# Patient Record
Sex: FEMALE | Race: WHITE | NOT HISPANIC OR LATINO | Employment: UNEMPLOYED | URBAN - METROPOLITAN AREA
[De-identification: names, ages, dates, MRNs, and addresses within clinical notes are randomized per-mention and may not be internally consistent; named-entity substitution may affect disease eponyms.]

---

## 2017-04-15 ENCOUNTER — HOSPITAL ENCOUNTER (EMERGENCY)
Facility: HOSPITAL | Age: 27
Discharge: HOME | End: 2017-04-15
Attending: EMERGENCY MEDICINE
Payer: MEDICAID

## 2017-04-15 VITALS
TEMPERATURE: 99 F | SYSTOLIC BLOOD PRESSURE: 107 MMHG | OXYGEN SATURATION: 100 % | HEIGHT: 70 IN | WEIGHT: 200 LBS | HEART RATE: 81 BPM | DIASTOLIC BLOOD PRESSURE: 55 MMHG | BODY MASS INDEX: 28.63 KG/M2 | RESPIRATION RATE: 16 BRPM

## 2017-04-15 DIAGNOSIS — R05.9 COUGH: ICD-10-CM

## 2017-04-15 DIAGNOSIS — R19.7 VOMITING AND DIARRHEA: Primary | ICD-10-CM

## 2017-04-15 DIAGNOSIS — R11.10 VOMITING AND DIARRHEA: Primary | ICD-10-CM

## 2017-04-15 DIAGNOSIS — R10.13 EPIGASTRIC ABDOMINAL PAIN: ICD-10-CM

## 2017-04-15 LAB
ALBUMIN SERPL BCP-MCNC: 2.9 G/DL
ALP SERPL-CCNC: 51 U/L
ALT SERPL W/O P-5'-P-CCNC: 12 U/L
ANION GAP SERPL CALC-SCNC: 9 MMOL/L
AST SERPL-CCNC: 18 U/L
B-HCG UR QL: POSITIVE
BASOPHILS # BLD AUTO: 0 K/UL
BASOPHILS NFR BLD: 0.3 %
BILIRUB SERPL-MCNC: 0.3 MG/DL
BUN SERPL-MCNC: 13 MG/DL
CALCIUM SERPL-MCNC: 8.6 MG/DL
CHLORIDE SERPL-SCNC: 107 MMOL/L
CO2 SERPL-SCNC: 20 MMOL/L
CREAT SERPL-MCNC: 0.6 MG/DL
CTP QC/QA: YES
DIFFERENTIAL METHOD: ABNORMAL
EOSINOPHIL # BLD AUTO: 0.1 K/UL
EOSINOPHIL NFR BLD: 0.6 %
ERYTHROCYTE [DISTWIDTH] IN BLOOD BY AUTOMATED COUNT: 13.2 %
EST. GFR  (AFRICAN AMERICAN): >60 ML/MIN/1.73 M^2
EST. GFR  (NON AFRICAN AMERICAN): >60 ML/MIN/1.73 M^2
GLUCOSE SERPL-MCNC: 95 MG/DL
HCT VFR BLD AUTO: 31.6 %
HGB BLD-MCNC: 10.4 G/DL
LIPASE SERPL-CCNC: 27 U/L
LYMPHOCYTES # BLD AUTO: 0.7 K/UL
LYMPHOCYTES NFR BLD: 7.2 %
MCH RBC QN AUTO: 29.5 PG
MCHC RBC AUTO-ENTMCNC: 33.1 %
MCV RBC AUTO: 89 FL
MONOCYTES # BLD AUTO: 0.4 K/UL
MONOCYTES NFR BLD: 3.6 %
NEUTROPHILS # BLD AUTO: 8.9 K/UL
NEUTROPHILS NFR BLD: 88.3 %
PLATELET # BLD AUTO: 168 K/UL
PMV BLD AUTO: 9.5 FL
POTASSIUM SERPL-SCNC: 3.8 MMOL/L
PROT SERPL-MCNC: 6.7 G/DL
RBC # BLD AUTO: 3.54 M/UL
SODIUM SERPL-SCNC: 136 MMOL/L
WBC # BLD AUTO: 10.1 K/UL

## 2017-04-15 PROCEDURE — 80053 COMPREHEN METABOLIC PANEL: CPT

## 2017-04-15 PROCEDURE — 96361 HYDRATE IV INFUSION ADD-ON: CPT

## 2017-04-15 PROCEDURE — 96375 TX/PRO/DX INJ NEW DRUG ADDON: CPT

## 2017-04-15 PROCEDURE — 99284 EMERGENCY DEPT VISIT MOD MDM: CPT | Mod: 25

## 2017-04-15 PROCEDURE — 85025 COMPLETE CBC W/AUTO DIFF WBC: CPT

## 2017-04-15 PROCEDURE — 25000003 PHARM REV CODE 250: Performed by: EMERGENCY MEDICINE

## 2017-04-15 PROCEDURE — 81025 URINE PREGNANCY TEST: CPT | Performed by: EMERGENCY MEDICINE

## 2017-04-15 PROCEDURE — 96365 THER/PROPH/DIAG IV INF INIT: CPT

## 2017-04-15 PROCEDURE — 63600175 PHARM REV CODE 636 W HCPCS: Performed by: EMERGENCY MEDICINE

## 2017-04-15 PROCEDURE — 83690 ASSAY OF LIPASE: CPT

## 2017-04-15 PROCEDURE — 36415 COLL VENOUS BLD VENIPUNCTURE: CPT

## 2017-04-15 RX ORDER — ACETAMINOPHEN 10 MG/ML
1000 INJECTION, SOLUTION INTRAVENOUS
Status: COMPLETED | OUTPATIENT
Start: 2017-04-15 | End: 2017-04-15

## 2017-04-15 RX ORDER — ONDANSETRON 2 MG/ML
4 INJECTION INTRAMUSCULAR; INTRAVENOUS
Status: COMPLETED | OUTPATIENT
Start: 2017-04-15 | End: 2017-04-15

## 2017-04-15 RX ADMIN — ONDANSETRON 4 MG: 2 INJECTION INTRAMUSCULAR; INTRAVENOUS at 04:04

## 2017-04-15 RX ADMIN — SODIUM CHLORIDE 1000 ML: 0.9 INJECTION, SOLUTION INTRAVENOUS at 04:04

## 2017-04-15 RX ADMIN — ACETAMINOPHEN 1000 MG: 10 INJECTION, SOLUTION INTRAVENOUS at 04:04

## 2017-04-15 NOTE — ED PROVIDER NOTES
Chief complaint:  Abdominal Pain; Vomiting; Diarrhea; and Cough      HPI:  Ariela Calderon is a 26 y.o. female presenting with a 2 day history of nonproductive cough with several sick contacts in the family absence significant dyspnea with new onset of several episodes of nonbilious, nonbloody vomiting and 4 episodes of nonbloody diarrhea this evening.  Patient is having mild, intermittent, crampy pain centered in the epigastric area not present at the time of my evaluation.  It lasts several minutes at a time and then resolves.  She denies radiation or migration of pain.  No contraction like pain.  No passage of vaginal fluid, tissue, or vaginal bleeding.  Patient is .  She is 26 weeks pregnant.    ROS: As per HPI and below:  No headache, neck pain, syncope, chest pain, hemoptysis, measured fever, rashes, swelling, blood in the stools.    Review of patient's allergies indicates:  No Known Allergies    Patient's Medications   New Prescriptions    No medications on file   Previous Medications    PRENATAL VIT37/IRON/FOLIC ACID (PRENATA ORAL)    Take by mouth.   Modified Medications    No medications on file   Discontinued Medications    No medications on file       PMH:  As per HPI and below:  History reviewed. No pertinent past medical history.  History reviewed. No pertinent surgical history.    .      No hx of DM.    Social History     Social History    Marital status: Significant Other     Spouse name: N/A    Number of children: N/A    Years of education: N/A     Social History Main Topics    Smoking status: Never Smoker    Smokeless tobacco: None    Alcohol use No    Drug use: No    Sexual activity: Not Asked     Other Topics Concern    None     Social History Narrative    None       History reviewed. No pertinent family history.    Physical Exam:    Vitals:    04/15/17 0501   BP: (!) 107/55   Pulse: 81   Resp:    Temp:      GENERAL:  No apparent distress.  Alert.    HEENT:  Moist mucous  membranes.  Normocephalic and atraumatic.    NECK:  No swelling.  Midline trachea.  No JVD.  CARDIOVASCULAR:  Regular rate and rhythm.  2+ radial pulses.  No murmurs auscultated.  PULMONARY:  Lungs clear to auscultation bilaterally.  No wheezes, rales, or rhonci.  Unlabored respirations  ABDOMEN:  Gravid abdomen.  No reproducible tenderness to palpation.  No palpable hernias, masses.  EXTREMITIES:  Warm and well perfused.  Brisk capillary refill.  Trace pitting pedal edema bilaterally.  Legs are symmetric and nontender to palpation.  2+ DP and PT pulses.  NEUROLOGICAL:  Normal mental status.  Appropriate and conversant.  Normal gait.  SKIN:  No rashes or ecchymoses.    BACK:  Atraumatic.  No CVA tenderness to palpation.      Labs Reviewed   CBC W/ AUTO DIFFERENTIAL - Abnormal; Notable for the following:        Result Value    RBC 3.54 (*)     Hemoglobin 10.4 (*)     Hematocrit 31.6 (*)     Gran # 8.9 (*)     Lymph # 0.7 (*)     Gran% 88.3 (*)     Lymph% 7.2 (*)     Mono% 3.6 (*)     All other components within normal limits   COMPREHENSIVE METABOLIC PANEL - Abnormal; Notable for the following:     CO2 20 (*)     Calcium 8.6 (*)     Albumin 2.9 (*)     Alkaline Phosphatase 51 (*)     All other components within normal limits   POCT URINE PREGNANCY - Abnormal; Notable for the following:     POC Preg Test, Ur Positive (*)     All other components within normal limits   LIPASE       Current Discharge Medication List      CONTINUE these medications which have NOT CHANGED    Details   PRENATAL VIT37/IRON/FOLIC ACID (PRENATA ORAL) Take by mouth.             Orders Placed This Encounter   Procedures    CBC auto differential    Comprehensive metabolic panel    Lipase    Vital signs    POCT urine pregnancy    Insert peripheral IV       Imaging Results     None          ED Course           MDM:    26 y.o. female with has multiple symptoms including cough as well as vomiting and diarrhea.  She has had transient  intermittent upper abdominal pain.  There is no reproducible tenderness on examination with very low suspicion for emergent intra-abdominal process such as cholecystitis, obstruction, atypical appendicitis.  Laboratory sent to rule out dehydration and to assure no significant metabolic derangement.  I do not think further abdominal imaging is indicated at this point.  Patient strongly wishes to avoid this, which I feel is reasonable. Patient is pregnant but I do not think this is a complication of pregnancy and do not think she requires transfer for OB monitoring.  I do not think pelvic examination is indicated as there is no other concerning symptoms and pain is primarily upper.  I doubt premature labor.  Cough may be related to viral URI with multiple sick contacts in the family.  Lungs are clear and I doubt pneumonia.  I do not think antibiotics are indicated.  Vomiting and diarrhea may also be viral in etiology with no high-risk features for bacterial cause.    Patient feeling markedly better after symptomatic therapy.  Close follow up with OB recommended.  Acetaminophen and ondansetron as necessary for symptoms recommended for home.  She has these medications.  Return precautions reviewed in detail.    Diagnoses:    1. Cough  2. Vomiting and diarrhea  3. Epigastric abdominal pain     Rogelio Case MD  04/15/17 0709

## 2017-04-15 NOTE — ED NOTES
Patient identifiers for Ariela Calderon checked and correct.  LOC:  Patient is awake, alert, and aware of environment with an appropriate affect. Patient is oriented x 3 and speaking appropriately.  APPEARANCE:  Patient resting comfortably and in no acute distress. Patient is clean and well groomed, patient's clothing is properly fastened.  SKIN:  The skin is warm and dry. Patient has normal skin turgor and moist mucus membranes. Skin is intact; no bruising or breakdown noted.  MUSCULOSKELETAL:  Patient is moving all extremities well, no obvious deformities noted. Pulses intact.   RESPIRATORY:  Airway is open and patent. Respirations are spontaneous and non-labored with normal effort and rate.  CARDIAC:  Patient has a normal rate and rhythm. No peripheral edema noted. Capillary refill < 3 seconds.  ABDOMEN:  Pt 24 wks pregnant, bora upper abd pain.  NEUROLOGICAL:  PERRL. Facial expression is symmetrical. Hand grasps are equal bilaterally. Normal sensation in all extremities when touched with finger.  Allergies reported: Review of patient's allergies indicates:  No Known Allergies  OTHER NOTES: Pt c/o pain to bora upper abd, associated with N/V/D that started last night, and also reports cough x2 days. Pt is 24 wks pregnant, denies contraction like pain and vaginal bleeding.

## 2017-04-15 NOTE — ED NOTES
Pt states she is feeling better, okay for d/c, waiting for fluids to be done. Pt updated of POC, verbalizes understanding.

## 2017-04-15 NOTE — ED AVS SNAPSHOT
[facility] [address] [place] [id number]               Ariela Calderon   4/15/2017  3:32 AM   ED    Description:  Female : 1990   Department:  [facility]           Your Care was Coordinated By:     Provider Role From To    Rogelio Case MD Attending Provider 04/15/17 0335 --      Reason for Visit     Abdominal Pain     Vomiting     Diarrhea     Cough           Diagnoses this Visit        Comments    Vomiting and diarrhea    -  Primary     Epigastric abdominal pain         Cough           ED Disposition     None           To Do List           Follow-up Information     Please follow up.    Why:  Your OB doctor, 2-4 days      [facility]     [facility] Nurse Care Line -  Assistance  Unless otherwise directed by your provider, please contact [facility], our nurse care line that is available for  assistance.     Registered nurses in the [facility] provide: appointment scheduling, clinical advisement, health education, and other advisory services.  Call: [phone] (toll free)               Medications           Message regarding Medications     Verify the changes and/or additions to your medication regime listed below are the same as discussed with your clinician today.  If any of these changes or additions are incorrect, please notify your healthcare provider.        These medications were administered today        Dose Freq    sodium chloride 0.9% bolus 1,000 mL 1,000 mL ED 1 Time    Sig: Inject 1,000 mLs into the vein ED 1 Time.    Class: Normal    Route: Intravenous    ondansetron injection 4 mg 4 mg ED 1 Time    Sig: Inject 4 mg into the vein ED 1 Time.    Class: Normal    Route: Intravenous    acetaminophen (10 mg/mL) injection 1,000 mg 1,000 mg ED 1 Time    Sig: Inject 100 mLs (1,000 mg total) into the vein ED 1 Time.    Class: Normal    Route: Intravenous           Verify that the below  "list of medications is an accurate representation of the medications you are currently taking.  If none reported, the list may be blank. If incorrect, please contact your healthcare provider. Carry this list with you in case of emergency.           Current Medications     PRENATAL VIT37/IRON/FOLIC ACID (PRENATA ORAL) Take by mouth.           Clinical Reference Information           Your Vitals Were     BP Pulse Temp Resp Height Weight    117/62 95 98.6 °F (37 °C) (Oral) 16 5' 10" (1.778 m) 90.7 kg (200 lb)    SpO2 BMI             98% 28.7 kg/m2         Allergies as of 4/15/2017     No Known Allergies      Immunizations Administered on Date of Encounter - 4/15/2017     None      ED Micro, Lab, POCT     Start Ordered       Status Ordering Provider    04/15/17 0356 04/15/17 0356  CBC auto differential  STAT      Final result     04/15/17 0356 04/15/17 0356  Comprehensive metabolic panel  STAT      Final result     04/15/17 0356 04/15/17 0356  Lipase  STAT      Final result     04/15/17 0337 04/15/17 0336  POCT urine pregnancy  Once      Final result       ED Imaging Orders     None      Discharge References/Attachments     VOMITING AND DIARRHEA, NONSPECIFIC (ADULT) (ENGLISH)    EPIGASTRIC PAIN (UNCERTAIN CAUSE) (ENGLISH)      [facility] Sign-Up     Activating your [facility] account is as easy as 1-2-3!     1) Visit [email or web address], select Sign Up Now, enter this activation code and your date of birth, then select Next.  [email or web address]  Expires: 5/30/2017  5:01 AM      2) Create a username and password to use when you visit [facility] in the future and select a security question in case you lose your password and select Next.    3) Enter your e-mail address and click Sign Up!    Additional Information  If you have questions, please e-mail [email or web address] or call [phone] to talk to our [facility] staff. Remember, [facility] is NOT to be used for urgent needs. For medical emergencies, dial 911.          [facility] complies with applicable Federal civil rights laws and does not discriminate on the basis of race, color, national origin, age, disability, or sex.        Language Assistance Services     ATTENTION: Language assistance services are available, free of charge. Please call [phone].      ATENCIÓN: Si habla español, tiene a park disposición servicios gratuitos de asistencia lingüística. Llame al [phone].     CHÚ Ý: N?u b?n nói Ti?ng Vi?t, có các d?ch v? h? tr? ngôn ng? mi?n phí dành cho b?n. G?i s? [phone].

## 2017-06-22 ENCOUNTER — HOSPITAL ENCOUNTER (EMERGENCY)
Facility: HOSPITAL | Age: 27
Discharge: HOME | End: 2017-06-22
Attending: EMERGENCY MEDICINE
Payer: MEDICAID

## 2017-06-22 VITALS
SYSTOLIC BLOOD PRESSURE: 122 MMHG | OXYGEN SATURATION: 99 % | DIASTOLIC BLOOD PRESSURE: 74 MMHG | WEIGHT: 219 LBS | BODY MASS INDEX: 31.42 KG/M2 | RESPIRATION RATE: 16 BRPM | TEMPERATURE: 98 F | HEART RATE: 120 BPM

## 2017-06-22 DIAGNOSIS — S61.412A LACERATION OF LEFT HAND WITHOUT FOREIGN BODY, INITIAL ENCOUNTER: Primary | ICD-10-CM

## 2017-06-22 PROCEDURE — 12001 RPR S/N/AX/GEN/TRNK 2.5CM/<: CPT

## 2017-06-22 PROCEDURE — 25000003 PHARM REV CODE 250: Performed by: PHYSICIAN ASSISTANT

## 2017-06-22 PROCEDURE — 99283 EMERGENCY DEPT VISIT LOW MDM: CPT | Mod: 25

## 2017-06-22 RX ORDER — LIDOCAINE HYDROCHLORIDE 10 MG/ML
10 INJECTION INFILTRATION; PERINEURAL
Status: COMPLETED | OUTPATIENT
Start: 2017-06-22 | End: 2017-06-22

## 2017-06-22 RX ADMIN — LIDOCAINE HYDROCHLORIDE 10 ML: 10 INJECTION, SOLUTION INFILTRATION; PERINEURAL at 02:06

## 2017-06-22 NOTE — DISCHARGE INSTRUCTIONS
See your primary care provider to have sutures removed in 7-10 days.  Take tylenol as needed for pain.  Keep area clean and dry.  Wash with soap and water and pat dry.  For worsening symptoms, chest pain, shortness of breath, increased abdominal pain, high grade fever, stroke or stroke like symptoms, immediately go to the nearest Emergency Room or call 911 as soon as possible.

## 2017-06-22 NOTE — ED PROVIDER NOTES
Encounter Date: 6/22/2017       History     Chief Complaint   Patient presents with    Laceration     left hand laceration     Patient is a 26-year-old female who presents for laceration to left hand.  She denied past medical history.  She states she was working with [employer] when 2 pieces pinched her hand.  She reports associated bleeding and pain.  She states her last tetanus shot was within the last 5 years.  She denied attempted treatment.  She reports the pain is mild and constant.      The history is provided by the patient.     Review of patient's allergies indicates:  No Known Allergies  History reviewed. No pertinent past medical history.  History reviewed. No pertinent surgical history.  History reviewed. No pertinent family history.  Social History   Substance Use Topics    Smoking status: Never Smoker    Smokeless tobacco: Never Used    Alcohol use No     Review of Systems   Constitutional: Negative for fever.   HENT: Negative for sore throat.    Respiratory: Negative for shortness of breath.    Cardiovascular: Negative for chest pain.   Gastrointestinal: Negative for abdominal pain, diarrhea, nausea and vomiting.   Genitourinary: Negative for dysuria.   Musculoskeletal: Negative for back pain.        + Left hand pain   Skin: Positive for wound. Negative for rash.   Neurological: Negative for weakness.   Hematological: Does not bruise/bleed easily.       Physical Exam     Initial Vitals [06/22/17 1348]   BP Pulse Resp Temp SpO2   122/74 (!) 120 16 98 °F (36.7 °C) 99 %      MAP       90         Physical Exam    Nursing note and vitals reviewed.  Constitutional: She appears well-developed and well-nourished. No distress.   HENT:   Head: Normocephalic and atraumatic.   Right Ear: External ear normal.   Left Ear: External ear normal.   Eyes: Conjunctivae are normal. Pupils are equal, round, and reactive to light. Right eye exhibits no discharge. Left eye exhibits no discharge.   Neck: Normal range of  motion. Neck supple.   Cardiovascular: Normal rate, regular rhythm and normal heart sounds. Exam reveals no gallop and no friction rub.    No murmur heard.  Pulmonary/Chest: Breath sounds normal. She has no wheezes. She has no rhonchi. She has no rales.   Abdominal: Soft. Bowel sounds are normal. There is no tenderness. There is no guarding.   Gravid   Musculoskeletal: Normal range of motion.        Left hand: She exhibits tenderness and laceration. She exhibits normal range of motion and normal capillary refill. Normal sensation noted. Normal strength noted.        Hands:  Neurological: She is alert.   Skin: Skin is warm and dry.         ED Course   Lac Repair  Date/Time: 6/22/2017 6:48 PM  Performed by: QUYNH FRANKLIN  Authorized by: NORMA KNOX III   Body area: upper extremity  Location details: left hand  Laceration length: 2 cm  Tendon involvement: none  Nerve involvement: none    Anesthesia:  Local Anesthetic: lidocaine 1% without epinephrine  Anesthetic total: 1 mL  Preparation: Patient was prepped and draped in the usual sterile fashion.  Irrigation solution: saline  Irrigation method: jet lavage  Amount of cleaning: standard  Debridement: none  Skin closure: 4-0 Prolene  Number of sutures: 6  Technique: simple  Approximation: close  Approximation difficulty: simple  Dressing: dressing applied  Patient tolerance: Patient tolerated the procedure well with no immediate complications        Labs Reviewed - No data to display          Medical Decision Making:   History:   I obtained history from: someone other than patient.  Old Medical Records: I decided to obtain old medical records.  Clinical Tests:   Radiological Study: Ordered       APC / Resident Notes:   This is an emergent evaluation of a 26 year old female with complaint of laceration to the left hand. Patient is noted to have tenderness to palpation and a 2cm laceration noted. She is neuro-vascularly intact. Xray shows no acute  fracture. Wound was well irrigated. Laceration repair performed, see procedure note. Patient tolerated well. Patient was given instructions on wound care. Follow up with primary care provider for laceration repair. Return precautions given. All questions answered. Case was discussed with Dr. Burns who is in agreement with the plan of care.                 ED Course     Clinical Impression:   The encounter diagnosis was Laceration of left hand without foreign body, initial encounter.                           Kinga Drew PA-C  06/22/17 2744

## 2017-06-22 NOTE — ED NOTES
Laceration to top of left hand. Pt reports piece of granite fell on top of her hand. Pulses present.

## 2017-06-23 ENCOUNTER — HOSPITAL ENCOUNTER (EMERGENCY)
Facility: HOSPITAL | Age: 27
Discharge: HOME | End: 2017-06-23
Attending: EMERGENCY MEDICINE
Payer: MEDICAID

## 2017-06-23 VITALS
SYSTOLIC BLOOD PRESSURE: 127 MMHG | OXYGEN SATURATION: 99 % | DIASTOLIC BLOOD PRESSURE: 71 MMHG | RESPIRATION RATE: 20 BRPM | BODY MASS INDEX: 31.35 KG/M2 | TEMPERATURE: 98 F | WEIGHT: 219 LBS | HEART RATE: 107 BPM | HEIGHT: 70 IN

## 2017-06-23 DIAGNOSIS — Z78.9 TETANUS TOXOID VACCINATION STATUS UNKNOWN: Primary | ICD-10-CM

## 2017-06-23 PROCEDURE — 63600175 PHARM REV CODE 636 W HCPCS: Performed by: PHYSICIAN ASSISTANT

## 2017-06-23 PROCEDURE — 90715 TDAP VACCINE 7 YRS/> IM: CPT | Performed by: PHYSICIAN ASSISTANT

## 2017-06-23 PROCEDURE — 99283 EMERGENCY DEPT VISIT LOW MDM: CPT

## 2017-06-23 PROCEDURE — 90471 IMMUNIZATION ADMIN: CPT | Performed by: PHYSICIAN ASSISTANT

## 2017-06-23 RX ADMIN — CLOSTRIDIUM TETANI TOXOID ANTIGEN (FORMALDEHYDE INACTIVATED), CORYNEBACTERIUM DIPHTHERIAE TOXOID ANTIGEN (FORMALDEHYDE INACTIVATED), BORDETELLA PERTUSSIS TOXOID ANTIGEN (GLUTARALDEHYDE INACTIVATED), BORDETELLA PERTUSSIS FILAMENTOUS HEMAGGLUTININ ANTIGEN (FORMALDEHYDE INACTIVATED), BORDETELLA PERTUSSIS PERTACTIN ANTIGEN, AND BORDETELLA PERTUSSIS FIMBRIAE 2/3 ANTIGEN 0.5 ML: 5; 2; 2.5; 5; 3; 5 INJECTION, SUSPENSION INTRAMUSCULAR at 04:06

## 2017-06-23 NOTE — ED NOTES
Presents to the ER for tetanus. Patient was seen yesterday for laceration and thought she was up to date and found that she is not. AAOx4. Mucous membranes are pink and moist. Skin is warm, dry and intact.

## 2017-06-23 NOTE — ED PROVIDER NOTES
Encounter Date: 6/23/2017       History     Chief Complaint   Patient presents with    Other     presents for tetanus     Patient is a 26 year old female who presents for tetanus vaccine. She was seen in our facility yesterday and reported she was UTD on her tetanus. She states she went home last night and looked at her medical records and is unsure if she is in fact UTD. She called her OB/GYN and they instructed her to return to the ED for her tetanus vaccine. She denied any issues with laceration.       The history is provided by the patient.     Review of patient's allergies indicates:  No Known Allergies  History reviewed. No pertinent past medical history.  History reviewed. No pertinent surgical history.  History reviewed. No pertinent family history.  Social History   Substance Use Topics    Smoking status: Never Smoker    Smokeless tobacco: Never Used    Alcohol use No     Review of Systems   Constitutional: Negative for chills and fever.   HENT: Negative for congestion and sore throat.    Respiratory: Negative for cough and shortness of breath.    Cardiovascular: Negative for chest pain.   Gastrointestinal: Negative for nausea.   Genitourinary: Negative for dysuria.   Musculoskeletal: Negative for back pain.   Skin: Positive for wound. Negative for rash.   Neurological: Negative for weakness.   Hematological: Does not bruise/bleed easily.       Physical Exam     Initial Vitals [06/23/17 1648]   BP Pulse Resp Temp SpO2   127/71 107 20 98 °F (36.7 °C) 99 %      MAP       89.67         Physical Exam    Nursing note and vitals reviewed.  Constitutional: She appears well-developed and well-nourished. No distress.   HENT:   Head: Normocephalic and atraumatic.   Right Ear: External ear normal.   Left Ear: External ear normal.   Eyes: Conjunctivae are normal. Pupils are equal, round, and reactive to light. Right eye exhibits no discharge. Left eye exhibits no discharge.   Neck: Normal range of motion. Neck  supple.   Cardiovascular: Normal rate, regular rhythm and normal heart sounds. Exam reveals no gallop and no friction rub.    No murmur heard.  Pulmonary/Chest: Breath sounds normal. She has no wheezes. She has no rhonchi. She has no rales.   Abdominal: Soft. Bowel sounds are normal. There is no tenderness. There is no guarding.   Gravid    Musculoskeletal: Normal range of motion.   Well appearing laceration to dorsal aspect of left hand   Neurological: She is alert.   Skin: Skin is warm and dry.         ED Course   Procedures  Labs Reviewed - No data to display          Medical Decision Making:   History:   Old Medical Records: I decided to obtain old medical records.       APC / Resident Notes:   This is an emergent evaluation of a 26 year old female who presents for tetanus shot. She was seen in ED yesterday for laceration and reported at that time that she was UTD, but reports she went home and looked at her records and is not sure when her last tetanus was. She was updated on her Tdap. Discussed results with patient. Return precautions given. Patient is to follow up with their primary care provider. Case was discussed with Dr. Olivera who is in agreement with the plan of care. All questions answered.                 ED Course     Clinical Impression:   The encounter diagnosis was Tetanus toxoid vaccination status unknown.                           Kinga Drew PA-C  06/23/17 2705

## 2017-06-23 NOTE — DISCHARGE INSTRUCTIONS
Continue routine wound cleaning.  See your primary care provider in one week.  For worsening symptoms, chest pain, shortness of breath, increased abdominal pain, high grade fever, stroke or stroke like symptoms, immediately go to the nearest Emergency Room or call 911 as soon as possible.

## 2019-09-25 ENCOUNTER — HOSPITAL ENCOUNTER (EMERGENCY)
Facility: HOSPITAL | Age: 29
Discharge: HOME | End: 2019-09-25
Attending: EMERGENCY MEDICINE

## 2019-09-25 VITALS
SYSTOLIC BLOOD PRESSURE: 132 MMHG | RESPIRATION RATE: 18 BRPM | TEMPERATURE: 97 F | WEIGHT: 188 LBS | HEIGHT: 70 IN | DIASTOLIC BLOOD PRESSURE: 69 MMHG | HEART RATE: 83 BPM | BODY MASS INDEX: 26.92 KG/M2 | OXYGEN SATURATION: 97 %

## 2019-09-25 DIAGNOSIS — T85.79XA: Primary | ICD-10-CM

## 2019-09-25 PROCEDURE — 99284 EMERGENCY DEPT VISIT MOD MDM: CPT | Mod: 25

## 2019-09-25 PROCEDURE — 10060 I&D ABSCESS SIMPLE/SINGLE: CPT

## 2019-09-25 RX ORDER — LIDOCAINE HYDROCHLORIDE 20 MG/ML
5 INJECTION, SOLUTION EPIDURAL; INFILTRATION; INTRACAUDAL; PERINEURAL
Status: DISCONTINUED | OUTPATIENT
Start: 2019-09-25 | End: 2019-09-25 | Stop reason: HOSPADM

## 2019-09-25 NOTE — DISCHARGE INSTRUCTIONS
"No purulent material or "pus" was obtained on local, limited incision and drainage.  You should closely follow up with your doctor as discussed.  Continue antibiotics as recommended.  "

## 2019-09-25 NOTE — ED PROVIDER NOTES
Encounter Date: 9/25/2019    SCRIBE #1 NOTE: I, Wendy Burrell, am scribing for, and in the presence of, Rogelio Case MD.       History     Chief Complaint   Patient presents with    Allergic Reaction     birth control implant area  presently on Bactrim       Time seen by provider: 1:25 PM on 09/25/2019    Ariela Lebron is a 29 y.o. female who presents to the ED with an onset of left upper arm pain and redness. She was had a birth control implant (Nexplanon) inserted 5 days ago on 9/20 at [facility]. The patient returned to [facility] yesterday and was started on a course of Bactrim. She reports the redness and pain have worsened compared to yesterday after starting the antibiotics. The patient states she has had this implant before and developed bruising over the insertion site. She denies any other symptoms at this time. No PMHx or PSHx. No known drug allergies.    The history is provided by the patient.     Review of patient's allergies indicates:  No Known Allergies  No past medical history on file.  No past surgical history on file.  No family history on file.  Social History     Tobacco Use    Smoking status: Never Smoker    Smokeless tobacco: Never Used   Substance Use Topics    Alcohol use: No    Drug use: No     Review of Systems   Constitutional: Negative for chills and fever.   HENT: Negative for nosebleeds.    Eyes: Negative for visual disturbance.   Respiratory: Negative for cough and shortness of breath.    Cardiovascular: Negative for chest pain and palpitations.   Gastrointestinal: Negative for abdominal pain, diarrhea, nausea and vomiting.   Genitourinary: Negative for dysuria and hematuria.   Musculoskeletal: Positive for myalgias (left tricept). Negative for back pain and neck pain.   Skin: Positive for color change (erythema). Negative for rash.   Neurological: Negative for seizures, syncope and headaches.     Physical Exam     Initial Vitals [09/25/19 1320]    BP Pulse Resp Temp SpO2   132/69 83 18 97.3 °F (36.3 °C) 97 %      MAP       --         Physical Exam    Nursing note and vitals reviewed.  Constitutional: She appears well-developed and well-nourished. She is not diaphoretic. No distress.   HENT:   Head: Normocephalic and atraumatic.   Mouth/Throat: Oropharynx is clear and moist.   Eyes: Conjunctivae are normal.   Neck: Neck supple.   Cardiovascular: Normal rate, regular rhythm, normal heart sounds and intact distal pulses. Exam reveals no gallop and no friction rub.    No murmur heard.  Pulmonary/Chest: Breath sounds normal. She has no wheezes. She has no rhonchi. She has no rales.   Musculoskeletal: Normal range of motion. She exhibits tenderness.   Area of erythema to the left posterior tricep region with a proximal area of tenderness and mild fluctuance measuring ~1 cm seen on bedside US. No spontaneous drainage.    Neurological: She is alert and oriented to person, place, and time.   Skin: No rash noted. There is erythema.   Psychiatric: Her speech is normal.       ED Course   I & D - Incision and Drainage  Date/Time: 9/25/2019 1:58 PM  Performed by: Rogelio Case MD  Authorized by: Rogelio Case MD   Site marked: the operative site was marked  Body area: upper extremity (left posterior tricep)  Anesthesia: local infiltration    Anesthesia:  Local anesthesia used: yes  Local Anesthetic: lidocaine 2% without epinephrine  Anesthetic total: 1 mL  Patient sedated: no  Scalpel size: 11  Incision type: single straight  Complexity: simple  Drainage: bloody  Drainage amount: scant  Wound treatment: incision,  wound left open and  expression of material  Packing material: none  Complications: No  Specimens: No  Implants: No  Patient tolerance: Patient tolerated the procedure well with no immediate complications        Labs Reviewed - No data to display     Imaging Results    None          Medical Decision Making:   History:   Old Medical Records: I  decided to obtain old medical records.            Scribe Attestation:   Scribe #1: I performed the above scribed service and the documentation accurately describes the services I performed. I attest to the accuracy of the note.    I, Dr. Rogelio Case, personally performed the services described in this documentation. All medical record entries made by the scribe were at my direction and in my presence.  I have reviewed the chart and agree that the record reflects my personal performance and is accurate and complete. Rogelio Case MD.  3:11 PM 09/25/2019    Ariela Lebron is a 29 y.o. female presenting with redness and pain to area of implanted birth control.  She has been following closely with her gynecology group.  She has upcoming close follow-up and is on antibiotics.  There is a small area of fluctuance and surrounding fluid collection seen.  Possibility of hematoma versus abscess discussed in detail with patient who wishes attempted incision and drainage. I did explain that removal of implanted device will be deferred to primary team.  No purulent material obtained and this is not consistent with abscess.  She has been on antibiotics only for around today with insufficient time for response.  I do not think change in antibiotics or admission for IV antibiotics is indicated.  Low suspicion for deep space infection.  I do not think further imaging is indicated.  Return precautions reviewed.        ED Course as of Sep 25 1359   Wed Sep 25, 2019   1357 ABSCESS SIMPLE INCISION & DRAINAGE:  Verbal consent was obtained prior to the procedure with explanation of risks, benefits, and alternatives.  The area was anesthetized with 2% lidocaine intradermal infiltration prior to the start of the procedure.  A 1 cm fluid on the left arm was prepared with betadine,  then incised with a #11 blade.  No purulent drainage was obtained.  The wound was swept 360 degrees with a hemostat to break up any  loculations.  There were no recognized complications and the patient tolerated the procedure well.      [MR]   1358 Bedside US prior to I&D showed implanted device with small rim of surrounding fluid.    [MR]      ED Course User Index  [MR] Rogelio Case MD     Clinical Impression:       ICD-10-CM ICD-9-CM   1. Inflammatory reaction due to other internal device, initial encounter T85.79XA 996.69         Disposition:   Disposition: Discharged  Condition: Stable                        Rogelio Case MD  09/25/19 2294

## 2019-10-09 ENCOUNTER — HOSPITAL ENCOUNTER (EMERGENCY)
Facility: HOSPITAL | Age: 29
Discharge: HOME | End: 2019-10-10
Attending: EMERGENCY MEDICINE

## 2019-10-09 VITALS
HEIGHT: 70 IN | WEIGHT: 182 LBS | RESPIRATION RATE: 18 BRPM | SYSTOLIC BLOOD PRESSURE: 129 MMHG | OXYGEN SATURATION: 98 % | DIASTOLIC BLOOD PRESSURE: 79 MMHG | TEMPERATURE: 98 F | HEART RATE: 61 BPM | BODY MASS INDEX: 26.05 KG/M2

## 2019-10-09 DIAGNOSIS — R19.7 VOMITING AND DIARRHEA: ICD-10-CM

## 2019-10-09 DIAGNOSIS — K52.9 GASTROENTERITIS: Primary | ICD-10-CM

## 2019-10-09 DIAGNOSIS — R11.10 VOMITING AND DIARRHEA: ICD-10-CM

## 2019-10-09 PROCEDURE — 99284 EMERGENCY DEPT VISIT MOD MDM: CPT | Mod: 25

## 2019-10-09 RX ORDER — VALPROIC ACID 250 MG/1
500 CAPSULE, LIQUID FILLED ORAL NIGHTLY
COMMUNITY

## 2019-10-09 RX ORDER — METOCLOPRAMIDE HYDROCHLORIDE 5 MG/ML
10 INJECTION INTRAMUSCULAR; INTRAVENOUS
Status: COMPLETED | OUTPATIENT
Start: 2019-10-09 | End: 2019-10-10

## 2019-10-09 RX ORDER — HYOSCYAMINE SULFATE 0.5 MG/ML
0.5 INJECTION, SOLUTION SUBCUTANEOUS
Status: COMPLETED | OUTPATIENT
Start: 2019-10-09 | End: 2019-10-10

## 2019-10-09 RX ORDER — SODIUM CHLORIDE 9 MG/ML
1000 INJECTION, SOLUTION INTRAVENOUS
Status: COMPLETED | OUTPATIENT
Start: 2019-10-09 | End: 2019-10-10

## 2019-10-09 RX ORDER — TOPIRAMATE 50 MG/1
50 TABLET, FILM COATED ORAL DAILY
COMMUNITY

## 2019-10-10 VITALS
TEMPERATURE: 98 F | HEIGHT: 70 IN | BODY MASS INDEX: 26.05 KG/M2 | HEART RATE: 76 BPM | OXYGEN SATURATION: 100 % | WEIGHT: 182 LBS | RESPIRATION RATE: 16 BRPM | DIASTOLIC BLOOD PRESSURE: 70 MMHG | SYSTOLIC BLOOD PRESSURE: 136 MMHG

## 2019-10-10 DIAGNOSIS — K52.9 GASTROENTERITIS: Primary | ICD-10-CM

## 2019-10-10 LAB
ALBUMIN SERPL BCP-MCNC: 4.5 G/DL (ref 3.5–5.2)
ALP SERPL-CCNC: 57 U/L (ref 55–135)
ALT SERPL W/O P-5'-P-CCNC: 19 U/L (ref 10–44)
ANION GAP SERPL CALC-SCNC: 11 MMOL/L (ref 8–16)
AST SERPL-CCNC: 16 U/L (ref 10–40)
BASOPHILS # BLD AUTO: 0.05 K/UL (ref 0–0.2)
BASOPHILS NFR BLD: 0.4 % (ref 0–1.9)
BILIRUB SERPL-MCNC: 0.4 MG/DL (ref 0.1–1)
BUN SERPL-MCNC: 14 MG/DL (ref 6–20)
CALCIUM SERPL-MCNC: 9.7 MG/DL (ref 8.7–10.5)
CHLORIDE SERPL-SCNC: 104 MMOL/L (ref 95–110)
CO2 SERPL-SCNC: 22 MMOL/L (ref 23–29)
CREAT SERPL-MCNC: 1 MG/DL (ref 0.5–1.4)
DIFFERENTIAL METHOD: ABNORMAL
EOSINOPHIL # BLD AUTO: 0.1 K/UL (ref 0–0.5)
EOSINOPHIL NFR BLD: 0.5 % (ref 0–8)
ERYTHROCYTE [DISTWIDTH] IN BLOOD BY AUTOMATED COUNT: 12.5 % (ref 11.5–14.5)
EST. GFR  (AFRICAN AMERICAN): >60 ML/MIN/1.73 M^2
EST. GFR  (NON AFRICAN AMERICAN): >60 ML/MIN/1.73 M^2
GLUCOSE SERPL-MCNC: 110 MG/DL (ref 70–110)
HCT VFR BLD AUTO: 43.2 % (ref 37–48.5)
HGB BLD-MCNC: 14 G/DL (ref 12–16)
IMM GRANULOCYTES # BLD AUTO: 0.09 K/UL (ref 0–0.04)
LIPASE SERPL-CCNC: 24 U/L (ref 4–60)
LYMPHOCYTES # BLD AUTO: 2.8 K/UL (ref 1–4.8)
LYMPHOCYTES NFR BLD: 22.5 % (ref 18–48)
MCH RBC QN AUTO: 29.9 PG (ref 27–31)
MCHC RBC AUTO-ENTMCNC: 32.4 G/DL (ref 32–36)
MCV RBC AUTO: 92 FL (ref 82–98)
MONOCYTES # BLD AUTO: 0.8 K/UL (ref 0.3–1)
MONOCYTES NFR BLD: 6.4 % (ref 4–15)
NEUTROPHILS # BLD AUTO: 8.6 K/UL (ref 1.8–7.7)
NEUTROPHILS NFR BLD: 69.5 % (ref 38–73)
NRBC BLD-RTO: 0 /100 WBC
PLATELET # BLD AUTO: 197 K/UL (ref 150–350)
PMV BLD AUTO: 12.9 FL (ref 9.2–12.9)
POTASSIUM SERPL-SCNC: 3.6 MMOL/L (ref 3.5–5.1)
PROT SERPL-MCNC: 8.2 G/DL (ref 6–8.4)
RBC # BLD AUTO: 4.69 M/UL (ref 4–5.4)
SODIUM SERPL-SCNC: 137 MMOL/L (ref 136–145)
WBC # BLD AUTO: 12.41 K/UL (ref 3.9–12.7)

## 2019-10-10 PROCEDURE — 96375 TX/PRO/DX INJ NEW DRUG ADDON: CPT

## 2019-10-10 PROCEDURE — 83690 ASSAY OF LIPASE: CPT

## 2019-10-10 PROCEDURE — 85025 COMPLETE CBC W/AUTO DIFF WBC: CPT

## 2019-10-10 PROCEDURE — 36415 COLL VENOUS BLD VENIPUNCTURE: CPT

## 2019-10-10 PROCEDURE — 96365 THER/PROPH/DIAG IV INF INIT: CPT

## 2019-10-10 PROCEDURE — 96374 THER/PROPH/DIAG INJ IV PUSH: CPT

## 2019-10-10 PROCEDURE — 96366 THER/PROPH/DIAG IV INF ADDON: CPT

## 2019-10-10 PROCEDURE — 63600175 PHARM REV CODE 636 W HCPCS: Performed by: EMERGENCY MEDICINE

## 2019-10-10 PROCEDURE — 96361 HYDRATE IV INFUSION ADD-ON: CPT

## 2019-10-10 PROCEDURE — 80053 COMPREHEN METABOLIC PANEL: CPT

## 2019-10-10 PROCEDURE — 25000003 PHARM REV CODE 250: Performed by: EMERGENCY MEDICINE

## 2019-10-10 PROCEDURE — 99284 EMERGENCY DEPT VISIT MOD MDM: CPT | Mod: 25

## 2019-10-10 RX ORDER — SPIRONOLACTONE 100 MG/1
50 TABLET, FILM COATED ORAL EVERY MORNING
COMMUNITY

## 2019-10-10 RX ORDER — DIPHENOXYLATE HYDROCHLORIDE AND ATROPINE SULFATE 2.5; .025 MG/1; MG/1
2 TABLET ORAL 4 TIMES DAILY PRN
Qty: 14 TABLET | Refills: 0 | Status: SHIPPED | OUTPATIENT
Start: 2019-10-10 | End: 2019-10-10 | Stop reason: CLARIF

## 2019-10-10 RX ORDER — DIPHENHYDRAMINE HYDROCHLORIDE 50 MG/ML
25 INJECTION INTRAMUSCULAR; INTRAVENOUS
Status: COMPLETED | OUTPATIENT
Start: 2019-10-10 | End: 2019-10-10

## 2019-10-10 RX ORDER — BIOTIN 1 MG
1000 TABLET ORAL DAILY
COMMUNITY

## 2019-10-10 RX ORDER — ONDANSETRON HYDROCHLORIDE 8 MG/1
8 TABLET, FILM COATED ORAL EVERY 12 HOURS PRN
Qty: 8 TABLET | Refills: 1 | Status: SHIPPED | OUTPATIENT
Start: 2019-10-10 | End: 2019-10-10 | Stop reason: CLARIF

## 2019-10-10 RX ORDER — ONDANSETRON 2 MG/ML
8 INJECTION INTRAMUSCULAR; INTRAVENOUS
Status: COMPLETED | OUTPATIENT
Start: 2019-10-10 | End: 2019-10-10

## 2019-10-10 RX ORDER — CHOLECALCIFEROL (VITAMIN D3) 25 MCG
1000 TABLET ORAL DAILY
COMMUNITY

## 2019-10-10 RX ORDER — VALACYCLOVIR HYDROCHLORIDE 500 MG/1
500 TABLET, FILM COATED ORAL 2 TIMES DAILY PRN
COMMUNITY

## 2019-10-10 RX ORDER — LORAZEPAM 2 MG/ML
0.5 INJECTION INTRAMUSCULAR
Status: COMPLETED | OUTPATIENT
Start: 2019-10-10 | End: 2019-10-10

## 2019-10-10 RX ORDER — PROMETHAZINE HYDROCHLORIDE 25 MG/1
25 TABLET ORAL EVERY 6 HOURS PRN
Qty: 15 TABLET | Refills: 0 | Status: ON HOLD | OUTPATIENT
Start: 2019-10-10 | End: 2019-10-12 | Stop reason: HOSPADM

## 2019-10-10 RX ORDER — ONDANSETRON 4 MG/1
4 TABLET, ORALLY DISINTEGRATING ORAL EVERY 8 HOURS PRN
Qty: 12 TABLET | Refills: 0 | Status: SHIPPED | OUTPATIENT
Start: 2019-10-10

## 2019-10-10 RX ADMIN — SODIUM CHLORIDE 1000 ML: 0.9 INJECTION, SOLUTION INTRAVENOUS at 05:10

## 2019-10-10 RX ADMIN — LIDOCAINE HYDROCHLORIDE: 20 SOLUTION ORAL; TOPICAL at 08:10

## 2019-10-10 RX ADMIN — DIPHENHYDRAMINE HYDROCHLORIDE 25 MG: 50 INJECTION INTRAMUSCULAR; INTRAVENOUS at 08:10

## 2019-10-10 RX ADMIN — ONDANSETRON 8 MG: 2 INJECTION INTRAMUSCULAR; INTRAVENOUS at 05:10

## 2019-10-10 RX ADMIN — LORAZEPAM 0.5 MG: 2 INJECTION INTRAMUSCULAR; INTRAVENOUS at 08:10

## 2019-10-10 RX ADMIN — METOCLOPRAMIDE 10 MG: 5 INJECTION, SOLUTION INTRAMUSCULAR; INTRAVENOUS at 12:10

## 2019-10-10 RX ADMIN — SODIUM CHLORIDE 1000 ML: 0.9 INJECTION, SOLUTION INTRAVENOUS at 12:10

## 2019-10-10 RX ADMIN — PROMETHAZINE HYDROCHLORIDE 25 MG: 25 INJECTION INTRAMUSCULAR; INTRAVENOUS at 05:10

## 2019-10-10 RX ADMIN — HYOSCYAMINE SULFATE 0.5 MG: 0.5 INJECTION, SOLUTION SUBCUTANEOUS at 12:10

## 2019-10-10 RX ADMIN — ONDANSETRON 8 MG: 2 INJECTION INTRAMUSCULAR; INTRAVENOUS at 12:10

## 2019-10-10 NOTE — ED NOTES
Pt. Resting comfortably, tolerated medication well, OBIE, father at bedside, will continue to monitor.

## 2019-10-10 NOTE — ED PROVIDER NOTES
Encounter Date: 10/10/2019    SCRIBE #1 NOTE: Kinga GALLAGHER and am scribing for, and in the presence of, Cj Manuel MD.       History     Chief Complaint   Patient presents with    Nausea    Vomiting     seen here last night for same    Diarrhea     Time seen by provider: 5:20 PM on 10/10/2019    Ariela Lebron is a 29 y.o. female who presents to the ED with an onset of nausea, vomiting, and diarrhea starting 1 day ago. She reports some associated mild abdominal pain. She was seen in the ED last night for these symptoms where she was given IV medicine which helped with her nausea. She reports that all of her symptoms returned when she went home, and she was unable to take her prescriptions due to vomiting. Her daughter had similar symptoms just prior to the onset of her symptoms, but all of her symptoms have resolved. She denies any recent antibiotics or strange foods. The patient denies fever, painful urination, runny nose, sore throat, chest pain, SOB, vaginal pain, vaginal discharge, or any other symptoms at this time. . No known drug allergies noted.    The history is provided by the patient.     Review of patient's allergies indicates:  No Known Allergies  Past Medical History:   Diagnosis Date    Bipolar 1 disorder      History reviewed. No pertinent surgical history.  History reviewed. No pertinent family history.  Social History     Tobacco Use    Smoking status: Never Smoker    Smokeless tobacco: Never Used   Substance Use Topics    Alcohol use: No    Drug use: No     Review of Systems   Constitutional: Negative for fever.   HENT: Negative for rhinorrhea and sore throat.    Respiratory: Negative for shortness of breath.    Cardiovascular: Negative for chest pain.   Gastrointestinal: Positive for abdominal pain, diarrhea, nausea and vomiting.   Genitourinary: Negative for dysuria, vaginal discharge and vaginal pain.   Musculoskeletal: Negative for back pain.   Skin:  Negative for rash.   Neurological: Negative for weakness.   Hematological: Does not bruise/bleed easily.       Physical Exam     Initial Vitals [10/10/19 1607]   BP Pulse Resp Temp SpO2   129/76 66 16 98.2 °F (36.8 °C) 100 %      MAP       --         Physical Exam    Nursing note and vitals reviewed.  Constitutional: She appears well-developed.   HENT:   Head: Normocephalic and atraumatic.   Mouth/Throat: Mucous membranes are dry (slight).   Eyes: EOM are normal. Pupils are equal, round, and reactive to light.   Neck: Neck supple.   Cardiovascular: Normal rate, regular rhythm, normal heart sounds and intact distal pulses. Exam reveals no gallop and no friction rub.    No murmur heard.  Pulmonary/Chest: Breath sounds normal. No respiratory distress. She has no decreased breath sounds. She has no wheezes. She has no rhonchi. She has no rales.   Abdominal: Soft. Bowel sounds are normal. She exhibits no distension. There is tenderness in the left upper quadrant.   Musculoskeletal: Normal range of motion.   Neurological: She is alert and oriented to person, place, and time.   Skin: Skin is warm and dry.   Psychiatric: She has a normal mood and affect.         ED Course   Procedures  Labs Reviewed - No data to display       Imaging Results    None          Medical Decision Making:   History:   Old Medical Records: I decided to obtain old medical records.  Patient appears have a viral gastritis with nausea and vomiting. Her vital signs are stable.  Labs are stable.  She felt better after medications given in emergency room.  I feel that she is stable for discharge at this time with return precautions.  I do not think she has an acute abdomen I do not think she needs emergent imaging.            Scribe Attestation:   Scribe #1: I performed the above scribed service and the documentation accurately describes the services I performed. I attest to the accuracy of the note.    I, Dr. Cj Manuel personally performed the  services described in this documentation. All medical record entries made by the scribe were at my direction and in my presence.  I have reviewed the chart and agree that the record reflects my personal performance and is accurate and complete. Cj Manuel MD.  9:14 PM 10/12/2019    DISCLAIMER: This note was prepared with Dragon NaturallySpeaking voice recognition transcription software. Garbled syntax, mangled pronouns, and other bizarre constructions may be attributed to that software system            Clinical Impression:       ICD-10-CM ICD-9-CM   1. Gastroenteritis K52.9 558.9                                Cj Manuel MD  10/12/19 2110

## 2019-10-10 NOTE — ED PROVIDER NOTES
Encounter Date: 10/9/2019    SCRIBE #1 NOTE: I, Elsy Bean, am scribing for, and in the presence of, Dr. Burns.       History     Chief Complaint   Patient presents with    Nausea     started this morning, got worse round 5pm    Emesis     many times       Time seen by provider: 11:34 PM on 10/09/2019    Ariela Lebron is a 29 y.o. female who presents to the ED with c/o abdominal pain and vomiting. She states she started to feel bad this morning and approximately 7 hours ago she started to have n/v/d with stomach cramps. She denies any fever but does feel warm. Patient states her daughter recently recovered from a virus and believes she has it. She denies any recent travels or suspicion of ingesting bad food. She has no other complaints at this time. PMHx includes bipolar disorder. No PSHx. No known drug allergies.      The history is provided by the patient.     Review of patient's allergies indicates:  No Known Allergies  Past Medical History:   Diagnosis Date    Bipolar 1 disorder      History reviewed. No pertinent surgical history.  History reviewed. No pertinent family history.  Social History     Tobacco Use    Smoking status: Never Smoker    Smokeless tobacco: Never Used   Substance Use Topics    Alcohol use: No    Drug use: No     Review of Systems   Constitutional: Negative for fever.   Respiratory: Negative for shortness of breath.    Gastrointestinal: Positive for abdominal pain, diarrhea, nausea and vomiting.   Genitourinary: Negative for flank pain.   Musculoskeletal: Negative for gait problem.   Neurological: Negative for weakness.   Psychiatric/Behavioral: Negative for confusion.       Physical Exam     Initial Vitals [10/09/19 2252]   BP Pulse Resp Temp SpO2   129/79 61 18 98 °F (36.7 °C) 98 %      MAP       --         Physical Exam    Nursing note and vitals reviewed.  Constitutional: She appears well-developed and well-nourished.   HENT:   Head: Normocephalic and  atraumatic.   Eyes: Conjunctivae are normal.   Neck: Normal range of motion. Neck supple.   Cardiovascular: Normal rate, regular rhythm and normal heart sounds. Exam reveals no gallop and no friction rub.    No murmur heard.  Pulmonary/Chest: Effort normal and breath sounds normal. No respiratory distress. She has no wheezes. She has no rhonchi. She has no rales.   Abdominal: Soft. She exhibits no distension. There is no tenderness. There is CVA tenderness.   Musculoskeletal: Normal range of motion.   Neurological: She is alert and oriented to person, place, and time.   Skin: Skin is warm and dry. No erythema.   Psychiatric: She has a normal mood and affect.         ED Course   Procedures  Labs Reviewed - No data to display       Imaging Results    None          Medical Decision Making:   History:   Old Medical Records: I decided to obtain old medical records.  ED Management:  29-year-old female presents with a 1 day history of nausea, vomiting and diarrhea with diffuse crampy abdominal pain.  She reports that her daughter has identical symptoms.  She denies any recent travel and has no known exposure to bad food.  The symptoms are strongly suggestive of a viral gastroenteritis.  She has no fever to suggest bacterial enteritis.  She is given antiemetics and antidiarrheals with complete relief of symptoms.       APC / Resident Notes:   I, Dr. David Burns III, personally performed the services described in this documentation. All medical record entries made by the scribe were at my direction and in my presence.  I have reviewed the chart and agree that the record reflects my personal performance and is accurate and complete       Scribe Attestation:   Scribe #1: I performed the above scribed service and the documentation accurately describes the services I performed. I attest to the accuracy of the note.               Clinical Impression:       ICD-10-CM ICD-9-CM   1. Gastroenteritis K52.9 558.9   2. Vomiting and  diarrhea R11.10 787.03    R19.7 787.91         Disposition:   Disposition: Discharged  Condition: Stable                        David Burns III, MD  10/10/19 0515

## 2019-10-12 ENCOUNTER — HOSPITAL ENCOUNTER (OUTPATIENT)
Facility: HOSPITAL | Age: 29
Discharge: HOME | End: 2019-10-12
Attending: EMERGENCY MEDICINE | Admitting: INTERNAL MEDICINE

## 2019-10-12 ENCOUNTER — NURSE TRIAGE (OUTPATIENT)
Dept: ADMINISTRATIVE | Facility: CLINIC | Age: 29
End: 2019-10-12

## 2019-10-12 VITALS
OXYGEN SATURATION: 100 % | HEIGHT: 70 IN | WEIGHT: 190.94 LBS | RESPIRATION RATE: 19 BRPM | DIASTOLIC BLOOD PRESSURE: 73 MMHG | BODY MASS INDEX: 27.34 KG/M2 | TEMPERATURE: 99 F | SYSTOLIC BLOOD PRESSURE: 123 MMHG | HEART RATE: 60 BPM

## 2019-10-12 DIAGNOSIS — R11.2 INTRACTABLE NAUSEA AND VOMITING: Primary | ICD-10-CM

## 2019-10-12 PROBLEM — F31.9 BIPOLAR 1 DISORDER: Status: ACTIVE | Noted: 2019-10-12

## 2019-10-12 PROBLEM — B34.9 VIRAL SYNDROME: Status: ACTIVE | Noted: 2019-10-12

## 2019-10-12 LAB
ALBUMIN SERPL BCP-MCNC: 4.3 G/DL (ref 3.5–5.2)
ALP SERPL-CCNC: 51 U/L (ref 55–135)
ALT SERPL W/O P-5'-P-CCNC: 19 U/L (ref 10–44)
AMPHET+METHAMPHET UR QL: NEGATIVE
ANION GAP SERPL CALC-SCNC: 10 MMOL/L (ref 8–16)
AST SERPL-CCNC: 15 U/L (ref 10–40)
B-HCG UR QL: NEGATIVE
BARBITURATES UR QL SCN>200 NG/ML: NEGATIVE
BASOPHILS # BLD AUTO: 0.04 K/UL (ref 0–0.2)
BASOPHILS NFR BLD: 0.4 % (ref 0–1.9)
BENZODIAZ UR QL SCN>200 NG/ML: NEGATIVE
BILIRUB SERPL-MCNC: 0.5 MG/DL (ref 0.1–1)
BILIRUB UR QL STRIP: NEGATIVE
BUN SERPL-MCNC: 10 MG/DL (ref 6–20)
BZE UR QL SCN: NEGATIVE
CALCIUM SERPL-MCNC: 9.4 MG/DL (ref 8.7–10.5)
CANNABINOIDS UR QL SCN: NEGATIVE
CHLORIDE SERPL-SCNC: 106 MMOL/L (ref 95–110)
CLARITY UR: CLEAR
CO2 SERPL-SCNC: 20 MMOL/L (ref 23–29)
COLOR UR: YELLOW
CREAT SERPL-MCNC: 0.9 MG/DL (ref 0.5–1.4)
CREAT UR-MCNC: 141.5 MG/DL (ref 15–325)
CTP QC/QA: YES
DIFFERENTIAL METHOD: ABNORMAL
EOSINOPHIL # BLD AUTO: 0.1 K/UL (ref 0–0.5)
EOSINOPHIL NFR BLD: 1.1 % (ref 0–8)
ERYTHROCYTE [DISTWIDTH] IN BLOOD BY AUTOMATED COUNT: 12.4 % (ref 11.5–14.5)
EST. GFR  (AFRICAN AMERICAN): >60 ML/MIN/1.73 M^2
EST. GFR  (NON AFRICAN AMERICAN): >60 ML/MIN/1.73 M^2
GLUCOSE SERPL-MCNC: 103 MG/DL (ref 70–110)
GLUCOSE UR QL STRIP: NEGATIVE
HCT VFR BLD AUTO: 42.5 % (ref 37–48.5)
HGB BLD-MCNC: 14.1 G/DL (ref 12–16)
HGB UR QL STRIP: NEGATIVE
IMM GRANULOCYTES # BLD AUTO: 0.06 K/UL (ref 0–0.04)
KETONES UR QL STRIP: NEGATIVE
LEUKOCYTE ESTERASE UR QL STRIP: NEGATIVE
LIPASE SERPL-CCNC: 30 U/L (ref 4–60)
LYMPHOCYTES # BLD AUTO: 2.7 K/UL (ref 1–4.8)
LYMPHOCYTES NFR BLD: 27.1 % (ref 18–48)
MAGNESIUM SERPL-MCNC: 1.9 MG/DL (ref 1.6–2.6)
MCH RBC QN AUTO: 30.1 PG (ref 27–31)
MCHC RBC AUTO-ENTMCNC: 33.2 G/DL (ref 32–36)
MCV RBC AUTO: 91 FL (ref 82–98)
METHADONE UR QL SCN>300 NG/ML: NEGATIVE
MONOCYTES # BLD AUTO: 0.7 K/UL (ref 0.3–1)
MONOCYTES NFR BLD: 7 % (ref 4–15)
NEUTROPHILS # BLD AUTO: 6.2 K/UL (ref 1.8–7.7)
NEUTROPHILS NFR BLD: 63.8 % (ref 38–73)
NITRITE UR QL STRIP: NEGATIVE
NRBC BLD-RTO: 0 /100 WBC
OPIATES UR QL SCN: NEGATIVE
PCP UR QL SCN>25 NG/ML: NEGATIVE
PH UR STRIP: 7 [PH] (ref 5–8)
PLATELET # BLD AUTO: 223 K/UL (ref 150–350)
PMV BLD AUTO: 10.7 FL (ref 9.2–12.9)
POTASSIUM SERPL-SCNC: 3.8 MMOL/L (ref 3.5–5.1)
PROCALCITONIN SERPL IA-MCNC: <0.02 NG/ML
PROT SERPL-MCNC: 7.8 G/DL (ref 6–8.4)
PROT UR QL STRIP: NEGATIVE
RBC # BLD AUTO: 4.69 M/UL (ref 4–5.4)
SODIUM SERPL-SCNC: 136 MMOL/L (ref 136–145)
SP GR UR STRIP: 1.02 (ref 1–1.03)
TOXICOLOGY INFORMATION: NORMAL
URN SPEC COLLECT METH UR: NORMAL
UROBILINOGEN UR STRIP-ACNC: NEGATIVE EU/DL
WBC # BLD AUTO: 9.8 K/UL (ref 3.9–12.7)

## 2019-10-12 PROCEDURE — 84145 PROCALCITONIN (PCT): CPT

## 2019-10-12 PROCEDURE — 81003 URINALYSIS AUTO W/O SCOPE: CPT | Mod: 59

## 2019-10-12 PROCEDURE — 83690 ASSAY OF LIPASE: CPT

## 2019-10-12 PROCEDURE — 80053 COMPREHEN METABOLIC PANEL: CPT

## 2019-10-12 PROCEDURE — 80307 DRUG TEST PRSMV CHEM ANLYZR: CPT

## 2019-10-12 PROCEDURE — 85025 COMPLETE CBC W/AUTO DIFF WBC: CPT

## 2019-10-12 PROCEDURE — 83735 ASSAY OF MAGNESIUM: CPT

## 2019-10-12 PROCEDURE — 96375 TX/PRO/DX INJ NEW DRUG ADDON: CPT

## 2019-10-12 PROCEDURE — 81025 URINE PREGNANCY TEST: CPT | Performed by: EMERGENCY MEDICINE

## 2019-10-12 PROCEDURE — 63600175 PHARM REV CODE 636 W HCPCS: Performed by: INTERNAL MEDICINE

## 2019-10-12 PROCEDURE — 36415 COLL VENOUS BLD VENIPUNCTURE: CPT

## 2019-10-12 PROCEDURE — 25000003 PHARM REV CODE 250: Performed by: INTERNAL MEDICINE

## 2019-10-12 PROCEDURE — 25000003 PHARM REV CODE 250: Performed by: NURSE PRACTITIONER

## 2019-10-12 PROCEDURE — 99285 EMERGENCY DEPT VISIT HI MDM: CPT | Mod: 25

## 2019-10-12 PROCEDURE — 96365 THER/PROPH/DIAG IV INF INIT: CPT

## 2019-10-12 PROCEDURE — G0378 HOSPITAL OBSERVATION PER HR: HCPCS

## 2019-10-12 PROCEDURE — 96361 HYDRATE IV INFUSION ADD-ON: CPT

## 2019-10-12 PROCEDURE — 63600175 PHARM REV CODE 636 W HCPCS: Performed by: EMERGENCY MEDICINE

## 2019-10-12 PROCEDURE — 25500020 PHARM REV CODE 255: Performed by: EMERGENCY MEDICINE

## 2019-10-12 RX ORDER — TOPIRAMATE 25 MG/1
50 TABLET ORAL DAILY
Status: DISCONTINUED | OUTPATIENT
Start: 2019-10-12 | End: 2019-10-12 | Stop reason: HOSPADM

## 2019-10-12 RX ORDER — IBUPROFEN 200 MG
16 TABLET ORAL
Status: DISCONTINUED | OUTPATIENT
Start: 2019-10-12 | End: 2019-10-12 | Stop reason: HOSPADM

## 2019-10-12 RX ORDER — SODIUM,POTASSIUM PHOSPHATES 280-250MG
2 POWDER IN PACKET (EA) ORAL
Status: DISCONTINUED | OUTPATIENT
Start: 2019-10-12 | End: 2019-10-12 | Stop reason: HOSPADM

## 2019-10-12 RX ORDER — DEXTROSE MONOHYDRATE AND SODIUM CHLORIDE 5; .9 G/100ML; G/100ML
INJECTION, SOLUTION INTRAVENOUS CONTINUOUS
Status: DISCONTINUED | OUTPATIENT
Start: 2019-10-12 | End: 2019-10-12 | Stop reason: HOSPADM

## 2019-10-12 RX ORDER — POTASSIUM CHLORIDE 20 MEQ/15ML
40 SOLUTION ORAL
Status: DISCONTINUED | OUTPATIENT
Start: 2019-10-12 | End: 2019-10-12 | Stop reason: HOSPADM

## 2019-10-12 RX ORDER — PROMETHAZINE HYDROCHLORIDE 12.5 MG/1
25 TABLET ORAL EVERY 6 HOURS PRN
Status: DISCONTINUED | OUTPATIENT
Start: 2019-10-12 | End: 2019-10-12 | Stop reason: HOSPADM

## 2019-10-12 RX ORDER — SODIUM CHLORIDE 0.9 % (FLUSH) 0.9 %
10 SYRINGE (ML) INJECTION
Status: DISCONTINUED | OUTPATIENT
Start: 2019-10-12 | End: 2019-10-12 | Stop reason: HOSPADM

## 2019-10-12 RX ORDER — LANOLIN ALCOHOL/MO/W.PET/CERES
800 CREAM (GRAM) TOPICAL
Status: DISCONTINUED | OUTPATIENT
Start: 2019-10-12 | End: 2019-10-12 | Stop reason: HOSPADM

## 2019-10-12 RX ORDER — GLUCAGON 1 MG
1 KIT INJECTION
Status: DISCONTINUED | OUTPATIENT
Start: 2019-10-12 | End: 2019-10-12 | Stop reason: HOSPADM

## 2019-10-12 RX ORDER — DIPHENHYDRAMINE HYDROCHLORIDE 50 MG/ML
12.5 INJECTION INTRAMUSCULAR; INTRAVENOUS
Status: COMPLETED | OUTPATIENT
Start: 2019-10-12 | End: 2019-10-12

## 2019-10-12 RX ORDER — SPIRONOLACTONE 25 MG/1
25 TABLET ORAL DAILY
Status: DISCONTINUED | OUTPATIENT
Start: 2019-10-12 | End: 2019-10-12 | Stop reason: HOSPADM

## 2019-10-12 RX ORDER — ONDANSETRON 8 MG/1
8 TABLET, ORALLY DISINTEGRATING ORAL EVERY 8 HOURS PRN
Qty: 10 TABLET | Refills: 0 | Status: SHIPPED | OUTPATIENT
Start: 2019-10-12 | End: 2019-10-22

## 2019-10-12 RX ORDER — PROMETHAZINE HYDROCHLORIDE 50 MG/1
50 SUPPOSITORY RECTAL EVERY 6 HOURS PRN
Qty: 20 SUPPOSITORY | Refills: 0 | Status: SHIPPED | OUTPATIENT
Start: 2019-10-12 | End: 2019-10-17

## 2019-10-12 RX ORDER — IBUPROFEN 200 MG
24 TABLET ORAL
Status: DISCONTINUED | OUTPATIENT
Start: 2019-10-12 | End: 2019-10-12 | Stop reason: HOSPADM

## 2019-10-12 RX ORDER — PROMETHAZINE HYDROCHLORIDE 50 MG/1
50 SUPPOSITORY RECTAL EVERY 6 HOURS PRN
Qty: 15 SUPPOSITORY | Refills: 0 | Status: SHIPPED | OUTPATIENT
Start: 2019-10-12

## 2019-10-12 RX ORDER — ONDANSETRON 2 MG/ML
4 INJECTION INTRAMUSCULAR; INTRAVENOUS EVERY 8 HOURS PRN
Status: DISCONTINUED | OUTPATIENT
Start: 2019-10-12 | End: 2019-10-12 | Stop reason: HOSPADM

## 2019-10-12 RX ADMIN — DIPHENHYDRAMINE HYDROCHLORIDE 12.5 MG: 50 INJECTION INTRAMUSCULAR; INTRAVENOUS at 06:10

## 2019-10-12 RX ADMIN — PROMETHAZINE HYDROCHLORIDE 12.5 MG: 25 INJECTION INTRAMUSCULAR; INTRAVENOUS at 06:10

## 2019-10-12 RX ADMIN — IOHEXOL 75 ML: 350 INJECTION, SOLUTION INTRAVENOUS at 07:10

## 2019-10-12 RX ADMIN — ONDANSETRON 4 MG: 2 INJECTION INTRAMUSCULAR; INTRAVENOUS at 10:10

## 2019-10-12 RX ADMIN — TOPIRAMATE 50 MG: 25 TABLET, FILM COATED ORAL at 02:10

## 2019-10-12 RX ADMIN — SPIRONOLACTONE 25 MG: 25 TABLET ORAL at 02:10

## 2019-10-12 RX ADMIN — DEXTROSE AND SODIUM CHLORIDE: 5; .9 INJECTION, SOLUTION INTRAVENOUS at 10:10

## 2019-10-12 RX ADMIN — SODIUM CHLORIDE 1000 ML: 0.9 INJECTION, SOLUTION INTRAVENOUS at 06:10

## 2019-10-12 RX ADMIN — ONDANSETRON 4 MG: 2 INJECTION INTRAMUSCULAR; INTRAVENOUS at 05:10

## 2019-10-12 RX ADMIN — PROMETHAZINE HYDROCHLORIDE 25 MG: 12.5 TABLET ORAL at 06:10

## 2019-10-12 NOTE — ED PROVIDER NOTES
Encounter Date: 10/12/2019       History     Chief Complaint   Patient presents with    Nausea     x 4 days, vomiting no relief with phenergan or zofran     This patient is a 29-year-old female with a past history of bipolar 1 presenting with complaints of nausea, vomiting, intermittent crampy abdominal pain and diarrhea for the last 4 days.  She reports symptoms were initially noted in her daughter who only had one day of vomiting and diarrhea.  She indicates her symptoms began the next morning with excessive vomiting and diarrhea.  She reports she has not been able to hold down any liquids or solids and has been seen in the emergency room twice in the past 4 days.  She reports IV Phenergan and other medications here helped briefly but she has had no relief with p.o. Zofran or promethazine at home.  She reports diarrhea has stopped but she believes it is because she has not ingested anything over the previous few days.  She denies accompanying fever, difficulty breathing, chest pain, vaginal discharge, abnormal vaginal bleeding, dysuria, CVA tenderness. She denies a previous history of bowel obstructions or surgical procedures.  She denies she is pregnant.  She denies recent travel, drinking abnormal water or blood or mucus in her stool.        Review of patient's allergies indicates:  No Known Allergies  Past Medical History:   Diagnosis Date    Bipolar 1 disorder      No past surgical history on file.  No family history on file.  Social History     Tobacco Use    Smoking status: Never Smoker    Smokeless tobacco: Never Used   Substance Use Topics    Alcohol use: No    Drug use: No     Review of Systems   Constitutional: Negative for fever.   HENT: Negative for congestion.    Respiratory: Negative for shortness of breath.    Cardiovascular: Negative for chest pain.   Gastrointestinal: Positive for diarrhea, nausea and vomiting.   Genitourinary: Negative for dysuria.   Musculoskeletal: Negative for back pain.    Skin: Negative for rash.   Allergic/Immunologic: Negative for immunocompromised state.   Neurological: Positive for weakness.   Psychiatric/Behavioral: Negative for confusion.       Physical Exam     Initial Vitals [10/12/19 0539]   BP Pulse Resp Temp SpO2   139/83 67 -- 98.4 °F (36.9 °C) 100 %      MAP       --         Physical Exam    Nursing note and vitals reviewed.  Constitutional: She appears well-developed and well-nourished.   HENT:   Head: Normocephalic and atraumatic.   Dry mucous membranes   Eyes: Conjunctivae and EOM are normal.   Neck: Normal range of motion. Neck supple.   Cardiovascular: Normal rate and regular rhythm.   Pulmonary/Chest: No respiratory distress. She has no wheezes.   Abdominal: Soft. There is no tenderness.   No focal abdominal pain, abdomen is soft, nonrigid, hypermobile bowel sounds   Musculoskeletal: Normal range of motion. She exhibits no edema.   Neurological: She is alert and oriented to person, place, and time.   Skin: Skin is warm and dry. Capillary refill takes less than 2 seconds.   Psychiatric: She has a normal mood and affect. Thought content normal.         ED Course   Procedures  Labs Reviewed   CBC W/ AUTO DIFFERENTIAL - Abnormal; Notable for the following components:       Result Value    Immature Grans (Abs) 0.06 (*)     All other components within normal limits   URINALYSIS, REFLEX TO URINE CULTURE    Narrative:     Preferred Collection Type->Urine, Clean Catch   COMPREHENSIVE METABOLIC PANEL   LIPASE   MAGNESIUM   POCT URINE PREGNANCY          Imaging Results    None                            ED Course as of Oct 12 1526   Sat Oct 12, 2019   0708 BP: 139/83 [EF]   0708 Temp: 98.4 °F (36.9 °C) [EF]   0708 Temp src: Oral [EF]   0708 Pulse: 67 [EF]   0708 SpO2: 100 % [EF]   0854 CT Abdomen Pelvis With Contrast [EF]   0904 Bhandary to admit for NV.  Cholelithiasis on CT but no signs of cholecystitis no tenderness in the right upper quadrant.    [EF]   0907 29-year-old  female presents to the ER for her 3rd visit in several days for nausea and vomiting. No improvement at home.  Continues to vomit.  Still quite nauseous in the ER.  CT demonstrates cholelithiasis but no signs of cholecystitis.  Patient will be admitted to Hospital Medicine for further care given numerous ER visits for persistent symptoms without improvement.    [EF]      ED Course User Index  [EF] Fabio Alba MD     Clinical Impression:       ICD-10-CM ICD-9-CM   1. Intractable nausea and vomiting R11.2 536.2         Disposition:   Disposition: Placed in Observation  Condition: José Antonio Olivera MD  10/12/19 6730

## 2019-10-12 NOTE — ED TRIAGE NOTES
Pt reports nausea, vomiting x 4 days. Daughter sick with similar symptoms earlier this week. Seen here two times previously this week for same, no relief at home. No abdominal pain, diarrhea.

## 2019-10-12 NOTE — ED NOTES
Patient identifiers for Ariela Lebron checked and correct.  LOC:  Ariela Lebron is awake, alert, and aware of environment with an appropriate affect. She is oriented x 3 and speaking appropriately.  APPEARANCE:  She is resting comfortably and in no acute distress. She is clean and well groomed, patient's clothing is properly fastened.  SKIN:  The skin is warm and dry. She has normal skin turgor and moist mucus membranes. Skin is intact; no bruising or breakdown noted.  MUSCULOSKELETAL:  She is moving all extremities well, no obvious deformities noted. Pulses intact.   RESPIRATORY:  Airway is open and patent. Respirations are spontaneous and non-labored with normal effort and rate.  CARDIAC:  She has a normal rate and rhythm. No peripheral edema noted. Capillary refill < 3 seconds.  ABDOMEN:  No distention noted.  Soft and non-tender upon palpation.  Pt endorses N/V x 4 days with no relief.  Denies ABD pain or diarrhea   NEUROLOGICAL:  PERRL. Facial expression is symmetrical. Hand grasps are equal bilaterally. Normal sensation in all extremities when touched with finger.  Allergies reported:  Review of patient's allergies indicates:  No Known Allergies  OTHER NOTES:

## 2019-10-12 NOTE — PLAN OF CARE
SW met w/ pt for assessment. Pt expected to discharge today after lunch.  Pt lives w/ her mother, reports independence w/ ADL, denies use of DME, home health, or other community based case management services.  Pt has access to the community health clinic as needed.  Plan is to discharge home, no discharge needs at present time.       10/12/19 1208   Discharge Assessment   Assessment Type Discharge Planning Assessment   Confirmed/corrected address and phone number on facesheet? Yes   Assessment information obtained from? Patient   Prior to hospitilization cognitive status: Unable to Assess   Prior to hospitalization functional status: Independent   Current cognitive status: Alert/Oriented   Current Functional Status: Independent   Facility Arrived From: home   Lives With parent(s)   Able to Return to Prior Arrangements yes   Is patient able to care for self after discharge? Yes   Who are your caregiver(s) and their phone number(s)? mother:  Eli Calderon  [phone]   Patient's perception of discharge disposition home or selfcare   Readmission Within the Last 30 Days no previous admission in last 30 days   Patient currently being followed by outpatient case management? No   Patient currently receives any other outside agency services? No   Equipment Currently Used at Home none   Do you have any problems affording any of your prescribed medications? TBD   Does the patient have transportation home? Yes   Transportation Anticipated family or friend will provide   Does the patient receive services at the [facility]mad[facility] Clinic? No   Discharge Plan A Home with family   Discharge Plan B Home with family   DME Needed Upon Discharge  none   Patient/Family in Agreement with Plan yes

## 2019-10-12 NOTE — SUBJECTIVE & OBJECTIVE
Past Medical History:   Diagnosis Date    Bipolar 1 disorder        Past Surgical History:   Procedure Laterality Date    AUGMENTATION OF BREAST         Review of patient's allergies indicates:  No Known Allergies    No current facility-administered medications on file prior to encounter.      Current Outpatient Medications on File Prior to Encounter   Medication Sig    biotin 1 mg tablet Take 1,000 mcg by mouth once daily.    multivitamin (THERAGRAN) tablet Take 1 tablet by mouth once daily.    ondansetron (ZOFRAN-ODT) 4 MG TbDL Take 1 tablet (4 mg total) by mouth every 8 (eight) hours as needed.    spironolactone (ALDACTONE) 100 MG tablet Take 50 mg by mouth every morning. 1/2 tablet every morning    topiramate (TOPAMAX) 50 MG tablet Take 50 mg by mouth once daily.    valACYclovir (VALTREX) 500 MG tablet Take 500 mg by mouth 2 (two) times daily as needed (fever blisters).    valproic acid (DEPAKENE) 250 mg capsule Take 500 mg by mouth every evening. 2 capsules nightly    vitamin D (VITAMIN D3) 1000 units Tab Take 1,000 Units by mouth once daily.    [DISCONTINUED] promethazine (PHENERGAN) 25 MG tablet Take 1 tablet (25 mg total) by mouth every 6 (six) hours as needed for Nausea.     Family History     Problem Relation (Age of Onset)    Diabetes Father    Hypertension Mother, Maternal Grandmother        Tobacco Use    Smoking status: Never Smoker    Smokeless tobacco: Never Used   Substance and Sexual Activity    Alcohol use: No    Drug use: Not Currently     Types: Marijuana     Comment: quit in 2018    Sexual activity: Not on file     Review of Systems   Constitutional: Positive for appetite change and fatigue. Negative for diaphoresis and fever.   HENT: Negative for congestion, facial swelling and hearing loss.    Eyes: Negative for photophobia and visual disturbance.   Respiratory: Negative for cough and shortness of breath.    Cardiovascular: Negative for chest pain and leg swelling.    Gastrointestinal: Positive for abdominal pain, nausea and vomiting. Negative for abdominal distention.   Endocrine: Negative for polyphagia and polyuria.   Genitourinary: Negative for difficulty urinating, dysuria and hematuria.   Musculoskeletal: Negative for back pain and gait problem.   Skin: Negative for rash.   Neurological: Negative for speech difficulty and numbness.   Psychiatric/Behavioral: Negative for agitation and confusion. The patient is not nervous/anxious.      Objective:     Vital Signs (Most Recent):  Temp: 98.1 °F (36.7 °C) (10/12/19 1100)  Pulse: 66 (10/12/19 1104)  Resp: 16 (10/12/19 1100)  BP: 135/86 (10/12/19 1104)  SpO2: 99 % (10/12/19 1100) Vital Signs (24h Range):  Temp:  [98.1 °F (36.7 °C)-98.4 °F (36.9 °C)] 98.1 °F (36.7 °C)  Pulse:  [57-67] 66  Resp:  [16-18] 16  SpO2:  [99 %-100 %] 99 %  BP: (130-146)/(79-92) 135/86     Weight: 86.6 kg (190 lb 14.7 oz)  Body mass index is 27.39 kg/m².    Physical Exam   Constitutional: She is oriented to person, place, and time. She appears well-developed and well-nourished.   HENT:   Head: Normocephalic and atraumatic.   Right Ear: External ear normal.   Left Ear: External ear normal.   Mouth/Throat: Oropharynx is clear and moist.   Eyes: Pupils are equal, round, and reactive to light. Conjunctivae and EOM are normal.   Neck: Normal range of motion. Neck supple. No JVD present.   Cardiovascular: Normal rate, regular rhythm, normal heart sounds and intact distal pulses.   No murmur heard.  Pulmonary/Chest: Effort normal and breath sounds normal. She has no wheezes.   Abdominal: Soft. Bowel sounds are normal. She exhibits no distension. There is no tenderness. There is no guarding.   Musculoskeletal: Normal range of motion.   Neurological: She is alert and oriented to person, place, and time. Coordination normal.   Skin: Skin is warm and dry. No erythema.   Psychiatric: She has a normal mood and affect. Her behavior is normal. Judgment normal.   Flat  affect   Nursing note and vitals reviewed.        CRANIAL NERVES     CN III, IV, VI   Pupils are equal, round, and reactive to light.  Extraocular motions are normal.        Significant Labs:   CBC:   Recent Labs   Lab 10/10/19  1804 10/12/19  0617   WBC 12.41 9.80   HGB 14.0 14.1   HCT 43.2 42.5    223     CMP:   Recent Labs   Lab 10/10/19  1804 10/12/19  0617    136   K 3.6 3.8    106   CO2 22* 20*    103   BUN 14 10   CREATININE 1.0 0.9   CALCIUM 9.7 9.4   PROT 8.2 7.8   ALBUMIN 4.5 4.3   BILITOT 0.4 0.5   ALKPHOS 57 51*   AST 16 15   ALT 19 19   ANIONGAP 11 10   EGFRNONAA >60 >60       Significant Imaging: I have reviewed and interpreted all pertinent imaging results/findings within the past 24 hours.     CT of abdomen demonstrates cholelithiasis and small renal stone.  No acute process noted

## 2019-10-12 NOTE — ASSESSMENT & PLAN NOTE
Initiated on IV fluids and antiemetics.  Change diet to clear liquids and advanced as tolerated to regular bland diet.

## 2019-10-12 NOTE — NURSING
Received to floor from ED. Pt assisted self to bed from wheelchair with standby assistance, NAD noted. Mother at bedside. Reviewed safety precautions with pt and use of call light,  pt verbalized understanding.

## 2019-10-12 NOTE — HPI
Ariela Lebron  is a 29-year-old female with a past history of bipolar 1 presenting with complaints of nausea, vomiting, intermittent crampy abdominal pain for 4 days.  She reports diarrhea 4 days which has resolved.  She reports she was evaluated in the ED 4 days ago and was prescribed Phenergan and Zofran without any improvement therefore she return to the ED yesterday for evaluation.  CBC and BMP were normal therefore she was discharged home and diagnosed with viral syndrome.  She states she is not able to tolerate liquids, oral antiemetics and home medications. She reports symptoms were initially noted in her daughter who only had one day of vomiting and diarrhea.  She denies abdominal pain, fever and chills.

## 2019-10-12 NOTE — H&P
[facility] Medicine  History & Physical    Patient Name: Ariela Lebron  MRN: [id number]  Admission Date: 10/12/2019  Attending Physician: Ina Geronimo NP  Primary Care Provider: Primary Doctor No         Patient information was obtained from patient, parent and ER records.     Subjective:     Principal Problem:Intractable nausea and vomiting    Chief Complaint:   Chief Complaint   Patient presents with    Nausea     x 4 days, vomiting no relief with phenergan or zofran        HPI: Ariela Lebron  is a 29-year-old female with a past history of bipolar 1 presenting with complaints of nausea, vomiting, intermittent crampy abdominal pain for 4 days.  She reports diarrhea 4 days which has resolved.  She reports she was evaluated in the ED 4 days ago and was prescribed Phenergan and Zofran without any improvement therefore she return to the ED yesterday for evaluation.  CBC and BMP were normal therefore she was discharged home and diagnosed with viral syndrome.  She states she is not able to tolerate liquids, oral antiemetics and home medications. She reports symptoms were initially noted in her daughter who only had one day of vomiting and diarrhea.   She denies abdominal pain, fever and chills.       Past Medical History:   Diagnosis Date    Bipolar 1 disorder        Past Surgical History:   Procedure Laterality Date    AUGMENTATION OF BREAST         Review of patient's allergies indicates:  No Known Allergies    No current facility-administered medications on file prior to encounter.      Current Outpatient Medications on File Prior to Encounter   Medication Sig    biotin 1 mg tablet Take 1,000 mcg by mouth once daily.    multivitamin (THERAGRAN) tablet Take 1 tablet by mouth once daily.    ondansetron (ZOFRAN-ODT) 4 MG TbDL Take 1 tablet (4 mg total) by mouth every 8 (eight) hours as needed.    spironolactone (ALDACTONE) 100 MG tablet Take  50 mg by mouth every morning. 1/2 tablet every morning    topiramate (TOPAMAX) 50 MG tablet Take 50 mg by mouth once daily.    valACYclovir (VALTREX) 500 MG tablet Take 500 mg by mouth 2 (two) times daily as needed (fever blisters).    valproic acid (DEPAKENE) 250 mg capsule Take 500 mg by mouth every evening. 2 capsules nightly    vitamin D (VITAMIN D3) 1000 units Tab Take 1,000 Units by mouth once daily.    [DISCONTINUED] promethazine (PHENERGAN) 25 MG tablet Take 1 tablet (25 mg total) by mouth every 6 (six) hours as needed for Nausea.     Family History     Problem Relation (Age of Onset)    Diabetes Father    Hypertension Mother, Maternal Grandmother        Tobacco Use    Smoking status: Never Smoker    Smokeless tobacco: Never Used   Substance and Sexual Activity    Alcohol use: No    Drug use: Not Currently     Types: Marijuana     Comment: quit in 2018    Sexual activity: Not on file     Review of Systems   Constitutional: Positive for appetite change and fatigue. Negative for diaphoresis and fever.   HENT: Negative for congestion, facial swelling and hearing loss.    Eyes: Negative for photophobia and visual disturbance.   Respiratory: Negative for cough and shortness of breath.    Cardiovascular: Negative for chest pain and leg swelling.   Gastrointestinal: Positive for abdominal pain, nausea and vomiting. Negative for abdominal distention.   Endocrine: Negative for polyphagia and polyuria.   Genitourinary: Negative for difficulty urinating, dysuria and hematuria.   Musculoskeletal: Negative for back pain and gait problem.   Skin: Negative for rash.   Neurological: Negative for speech difficulty and numbness.   Psychiatric/Behavioral: Negative for agitation and confusion. The patient is not nervous/anxious.      Objective:     Vital Signs (Most Recent):  Temp: 98.1 °F (36.7 °C) (10/12/19 1100)  Pulse: 66 (10/12/19 1104)  Resp: 16 (10/12/19 1100)  BP: 135/86 (10/12/19 1104)  SpO2: 99 % (10/12/19  1100) Vital Signs (24h Range):  Temp:  [98.1 °F (36.7 °C)-98.4 °F (36.9 °C)] 98.1 °F (36.7 °C)  Pulse:  [57-67] 66  Resp:  [16-18] 16  SpO2:  [99 %-100 %] 99 %  BP: (130-146)/(79-92) 135/86     Weight: 86.6 kg (190 lb 14.7 oz)  Body mass index is 27.39 kg/m².    Physical Exam   Constitutional: She is oriented to person, place, and time. She appears well-developed and well-nourished.   HENT:   Head: Normocephalic and atraumatic.   Right Ear: External ear normal.   Left Ear: External ear normal.   Mouth/Throat: Oropharynx is clear and moist.   Eyes: Pupils are equal, round, and reactive to light. Conjunctivae and EOM are normal.   Neck: Normal range of motion. Neck supple. No JVD present.   Cardiovascular: Normal rate, regular rhythm, normal heart sounds and intact distal pulses.   No murmur heard.  Pulmonary/Chest: Effort normal and breath sounds normal. She has no wheezes.   Abdominal: Soft. Bowel sounds are normal. She exhibits no distension. There is no tenderness. There is no guarding.   Musculoskeletal: Normal range of motion.   Neurological: She is alert and oriented to person, place, and time. Coordination normal.   Skin: Skin is warm and dry. No erythema.   Psychiatric: She has a normal mood and affect. Her behavior is normal. Judgment normal.   Flat affect   Nursing note and vitals reviewed.        CRANIAL NERVES     CN III, IV, VI   Pupils are equal, round, and reactive to light.  Extraocular motions are normal.        Significant Labs:   CBC:   Recent Labs   Lab 10/10/19  1804 10/12/19  0617   WBC 12.41 9.80   HGB 14.0 14.1   HCT 43.2 42.5    223     CMP:   Recent Labs   Lab 10/10/19  1804 10/12/19  0617    136   K 3.6 3.8    106   CO2 22* 20*    103   BUN 14 10   CREATININE 1.0 0.9   CALCIUM 9.7 9.4   PROT 8.2 7.8   ALBUMIN 4.5 4.3   BILITOT 0.4 0.5   ALKPHOS 57 51*   AST 16 15   ALT 19 19   ANIONGAP 11 10   EGFRNONAA >60 >60       Significant Imaging: I have reviewed and  interpreted all pertinent imaging results/findings within the past 24 hours.     CT of abdomen demonstrates cholelithiasis and small renal stone.  No acute process noted    Assessment/Plan:     * Intractable nausea and vomiting  Initiated on IV fluids and antiemetics.  Change diet to clear liquids and advanced as tolerated to regular bland diet.      Bipolar 1 disorder  Chronic resume home medications.      Viral syndrome  Will check procalcitonin to confirm.  Patient likely with viral syndrome with no signs of infection.    VTE Risk Mitigation (From admission, onward)         Ordered     Place sequential compression device  Until discontinued      10/12/19 1003     IP VTE LOW RISK PATIENT  Once      10/12/19 1003               Discussed with patient outpatient plan. Will advance diet and if tolerates may be discharged home.  Will change antiemetics to suppository and Zofran to ODT which she does not have at home.    Ina Geronimo NP  Department of Hospital Medicine   [facility]

## 2019-10-12 NOTE — NURSING
Discharge instructions given. Follow-up appointments and medications explained. Understanding verbalized. IV removed. Pt escorted out in wheelchair per staff.

## 2019-10-12 NOTE — PLAN OF CARE
10/12/19 1211   Final Note   Assessment Type Final Discharge Note   Anticipated Discharge Disposition Home

## 2019-10-12 NOTE — ASSESSMENT & PLAN NOTE
Will check procalcitonin to confirm.  Patient likely with viral syndrome with no signs of infection.

## 2019-10-13 NOTE — DISCHARGE SUMMARY
[facility] Medicine  Discharge Summary      Patient Name: Ariela Lebron  MRN: [id number]  Admission Date: 10/12/2019  Hospital Length of Stay: 0 days  Discharge Date and Time: 10/12/2019  6:58 PM  Attending Physician: Bhavna att. providers found   Discharging Provider: Ina Geronimo NP  Primary Care Provider: Primary Doctor Bhavna      HPI:   Ariela Lebron  is a 29-year-old female with a past history of bipolar 1 presenting with complaints of nausea, vomiting, intermittent crampy abdominal pain for 4 days.  She reports diarrhea 4 days which has resolved.  She reports she was evaluated in the ED 4 days ago and was prescribed Phenergan and Zofran without any improvement therefore she return to the ED yesterday for evaluation.  CBC and BMP were normal therefore she was discharged home and diagnosed with viral syndrome.  She states she is not able to tolerate liquids, oral antiemetics and home medications. She reports symptoms were initially noted in her daughter who only had one day of vomiting and diarrhea.   She denies abdominal pain, fever and chills.       * No surgery found *      Hospital Course:   Patient monitor during observation stay.  She was initiated on IV fluids.  She received antiemetics for nausea.  Procalcitonin is negative her symptoms are consistent with viral syndrome.  CT of the abdomen is negative for acute process.  Patient placed on cleared liquid diet and diet advanced.  She is tolerating diet without vomiting.  She is stable for discharge.     Consults:     No new Assessment & Plan notes have been filed under this hospital service since the last note was generated.  Service: Hospital Medicine    Final Active Diagnoses:    Diagnosis Date Noted POA    PRINCIPAL PROBLEM:  Intractable nausea and vomiting [R11.2] 10/12/2019 Yes    Viral syndrome [B34.9] 10/12/2019 Yes    Bipolar 1 disorder [F31.9] 10/12/2019 Yes      Problems  Resolved During this Admission:       Discharged Condition: stable    Disposition: Home or Self Care    Follow Up:  Follow-up Information     Primary Doctor No In 1 week.               Patient Instructions:      Diet Adult Regular   Order Comments: bland     Notify your health care provider if you experience any of the following:  persistent dizziness, light-headedness, or visual disturbances     Notify your health care provider if you experience any of the following:  worsening rash     Notify your health care provider if you experience any of the following:  severe persistent headache     Notify your health care provider if you experience any of the following:  difficulty breathing or increased cough     Notify your health care provider if you experience any of the following:  redness, tenderness, or signs of infection (pain, swelling, redness, odor or green/yellow discharge around incision site)     Notify your health care provider if you experience any of the following:  severe uncontrolled pain     Notify your health care provider if you experience any of the following:  persistent nausea and vomiting or diarrhea     Notify your health care provider if you experience any of the following:  temperature >100.4     Activity as tolerated       Significant Diagnostic Studies: Labs:   BMP:   Recent Labs   Lab 10/12/19  0617         K 3.8      CO2 20*   BUN 10   CREATININE 0.9   CALCIUM 9.4   MG 1.9    and CMP   Recent Labs   Lab 10/12/19  0617      K 3.8      CO2 20*      BUN 10   CREATININE 0.9   CALCIUM 9.4   PROT 7.8   ALBUMIN 4.3   BILITOT 0.5   ALKPHOS 51*   AST 15   ALT 19   ANIONGAP 10   ESTGFRAFRICA >60   EGFRNONAA >60     Radiology: CT scan: CT ABDOMEN PELVIS WITHOUT CONTRAST: No results found for this visit on 10/12/19.    Pending Diagnostic Studies:     None         Medications:  Reconciled Home Medications:      Medication List      START taking these medications    *  promethazine 50 MG suppository  Commonly known as:  PHENERGAN  Place 1 suppository (50 mg total) rectally every 6 (six) hours as needed for Nausea.  Replaces:  promethazine 25 MG tablet     * promethazine 50 MG suppository  Commonly known as:  PHENERGAN  Place 1 suppository (50 mg total) rectally every 6 (six) hours as needed for Nausea.         * This list has 2 medication(s) that are the same as other medications prescribed for you. Read the directions carefully, and ask your doctor or other care provider to review them with you.            CHANGE how you take these medications    * ondansetron 4 MG Tbdl  Commonly known as:  ZOFRAN-ODT  Take 1 tablet (4 mg total) by mouth every 8 (eight) hours as needed.  What changed:  Another medication with the same name was added. Make sure you understand how and when to take each.     * ondansetron 8 MG Tbdl  Commonly known as:  ZOFRAN-ODT  Take 1 tablet (8 mg total) by mouth every 8 (eight) hours as needed.  What changed:  You were already taking a medication with the same name, and this prescription was added. Make sure you understand how and when to take each.         * This list has 2 medication(s) that are the same as other medications prescribed for you. Read the directions carefully, and ask your doctor or other care provider to review them with you.            CONTINUE taking these medications    biotin 1 mg tablet  Take 1,000 mcg by mouth once daily.     multivitamin tablet  Commonly known as:  THERAGRAN  Take 1 tablet by mouth once daily.     spironolactone 100 MG tablet  Commonly known as:  ALDACTONE  Take 50 mg by mouth every morning. 1/2 tablet every morning     topiramate 50 MG tablet  Commonly known as:  TOPAMAX  Take 50 mg by mouth once daily.     valACYclovir 500 MG tablet  Commonly known as:  VALTREX  Take 500 mg by mouth 2 (two) times daily as needed (fever blisters).     valproic acid 250 mg capsule  Commonly known as:  DEPAKENE  Take 500 mg by mouth every  evening. 2 capsules nightly     vitamin D 1000 units Tab  Commonly known as:  VITAMIN D3  Take 1,000 Units by mouth once daily.        STOP taking these medications    promethazine 25 MG tablet  Commonly known as:  PHENERGAN  Replaced by:  promethazine 50 MG suppository            Indwelling Lines/Drains at time of discharge:   Lines/Drains/Airways     None                 Time spent on the discharge of patient: 60 minutes  Patient was seen and examined on the date of discharge and determined to be suitable for discharge.     Long discussion with patient regarding results of CT scan of abdomen and laboratory data.  All within normal limits.  Changed antiemetics yo Zofran ODT and Phenergan suppository.    Ina Geronimo NP  Department of Hospital Medicine  [facility]

## 2019-10-13 NOTE — TELEPHONE ENCOUNTER
Reason for Disposition   Caller has already spoken to PCP or another triager   Caller has already spoken with another triager or PCP AND has further questions AND triager able to answer questions.    Additional Information   Negative: [1] Caller is not with the adult (patient) AND [2] reporting urgent symptoms   Negative: Lab result questions   Negative: Medication questions   Negative: Caller can't be reached by phone   Negative: Caller is angry or rude (e.g., hangs up, verbally abusive, yelling)   Negative: Caller hangs up   Negative: Caller has already spoken with another triager and has no further questions.   Negative: Caller has already spoken with the PCP and has no further questions.    Protocols used: INFORMATION ONLY CALL-A-AH, NO CONTACT OR DUPLICATE CONTACT CALL-A-AH    Per caller No longer needs assistance, has already gone to the ED and received a prescription.

## 2019-10-13 NOTE — HOSPITAL COURSE
Patient monitor during observation stay.  She was initiated on IV fluids.  She received antiemetics for nausea.  Procalcitonin is negative her symptoms are consistent with viral syndrome.  CT of the abdomen is negative for acute process.  Patient placed on cleared liquid diet and diet advanced.  She is tolerating diet without vomiting.  She is stable for discharge.